# Patient Record
Sex: FEMALE | Race: BLACK OR AFRICAN AMERICAN | NOT HISPANIC OR LATINO
[De-identification: names, ages, dates, MRNs, and addresses within clinical notes are randomized per-mention and may not be internally consistent; named-entity substitution may affect disease eponyms.]

---

## 2019-05-14 PROBLEM — Z00.00 ENCOUNTER FOR PREVENTIVE HEALTH EXAMINATION: Status: ACTIVE | Noted: 2019-05-14

## 2019-05-20 ENCOUNTER — APPOINTMENT (OUTPATIENT)
Dept: OTOLARYNGOLOGY | Facility: CLINIC | Age: 64
End: 2019-05-20
Payer: COMMERCIAL

## 2019-05-20 VITALS
BODY MASS INDEX: 28.99 KG/M2 | HEART RATE: 80 BPM | OXYGEN SATURATION: 98 % | WEIGHT: 174 LBS | SYSTOLIC BLOOD PRESSURE: 151 MMHG | HEIGHT: 65 IN | DIASTOLIC BLOOD PRESSURE: 81 MMHG

## 2019-05-20 VITALS
HEART RATE: 78 BPM | DIASTOLIC BLOOD PRESSURE: 81 MMHG | OXYGEN SATURATION: 98 % | SYSTOLIC BLOOD PRESSURE: 155 MMHG | RESPIRATION RATE: 17 BRPM

## 2019-05-20 DIAGNOSIS — F17.200 NICOTINE DEPENDENCE, UNSPECIFIED, UNCOMPLICATED: ICD-10-CM

## 2019-05-20 DIAGNOSIS — Z80.9 FAMILY HISTORY OF MALIGNANT NEOPLASM, UNSPECIFIED: ICD-10-CM

## 2019-05-20 DIAGNOSIS — Z78.9 OTHER SPECIFIED HEALTH STATUS: ICD-10-CM

## 2019-05-20 DIAGNOSIS — Z82.3 FAMILY HISTORY OF STROKE: ICD-10-CM

## 2019-05-20 DIAGNOSIS — E04.2 NONTOXIC MULTINODULAR GOITER: ICD-10-CM

## 2019-05-20 PROCEDURE — 99203 OFFICE O/P NEW LOW 30 MIN: CPT | Mod: 25

## 2019-05-20 PROCEDURE — 31575 DIAGNOSTIC LARYNGOSCOPY: CPT

## 2019-05-21 NOTE — REVIEW OF SYSTEMS
[Patient Intake Form Reviewed] : Patient intake form was reviewed [Hoarseness] : hoarseness [As Noted in HPI] : as noted in HPI [Swelling Neck] : swelling neck [Swollen Glands In The Neck] : swollen glands in the neck [Negative] : Musculoskeletal [Throat Clearing] : no throat clearing [Throat Pain] : no throat pain [Swelling Face] : no face swelling [Fever] : no fever [Chills] : no chills [Feeling Poorly] : not feeling poorly [Feeling Tired] : not feeling tired [Chest Pain] : no chest pain [Palpitations] : no palpitations [Shortness Of Breath] : no shortness of breath [Wheezing] : no wheezing [Cough] : no cough [Skin Lesions] : no skin lesions [Confusion] : no confusion

## 2019-05-21 NOTE — HISTORY OF PRESENT ILLNESS
[de-identified] : 62 yo F referred by her primary MD Dr Bah for evaluation of thyroid u/s results which show multinodular goiter.  There is one dominant right sided nodule (1.7 x 1.5cm) and then two other right and three left sided sub-cm nodules.  She reports occasional tightness in her neck, persistent raspy voice with mild baseline hoarseness that she attributes to smoking.  She has no s/s of hyper/hypothyroidism, no previous radiation exposure.  She has not had an FNA biopsy, no recent thyroid labs.

## 2019-05-21 NOTE — DATA REVIEWED
[de-identified] : u/s report as discussed in HPI, lobes are 6x2.7x2.8 on the right and 6.1x2.2x2.1 on the left

## 2019-05-21 NOTE — ASSESSMENT
[FreeTextEntry1] : 63 yo F with MNG, thyromegaly with right mid-pole of the thyroid dominance and unknown thyroid function status\par -TFTs, antibodies ordered\par -pt given Rx for FNA, will obtain auth internally and schedule, final plan pending result of labs and FNA

## 2019-05-21 NOTE — PHYSICAL EXAM
[Laryngoscopy Performed] : laryngoscopy was performed, see procedure section for findings [Normal] : no rashes [de-identified] : there is prominence of thyroid with b/l extension past the margin of the SCM, no cervical LAD, no masses

## 2019-05-21 NOTE — PROCEDURE
[Image(s) Captured] : image(s) captured and filed [Hoarseness] : hoarseness not clearly evaluated by indirect laryngoscopy [None] : none [Flexible Endoscope] : examined with the flexible endoscope [de-identified] : flexible fiberoptic laryngoscope advanced orally, tonsillar pillars and tonsils/tonsil bed visualized, oropharyngeal and hypopharyngeal walls and BOT, epiglottis all visualized, no masses, no fungating/ulcerating lesions evident; larynx visualized, true vocal cords abduct and adduct and meet in the midline no leuko/erythroplakic lesions  \par  [de-identified] : multinodular goiter

## 2019-05-21 NOTE — END OF VISIT
[] : Fellow [FreeTextEntry3] : \par \par  [FreeTextEntry2] : I personally performed the laryngoscopy

## 2019-05-24 LAB
T3 SERPL-MCNC: 137 NG/DL
T3FREE SERPL-MCNC: 3.69 PG/ML
T4 FREE SERPL-MCNC: 1.2 NG/DL
T4 SERPL-MCNC: 8.3 UG/DL
THYROGLOB AB SERPL-ACNC: <20 IU/ML
THYROPEROXIDASE AB SERPL IA-ACNC: 16.2 IU/ML
TSH SERPL-ACNC: 0.99 UIU/ML